# Patient Record
Sex: MALE | Race: BLACK OR AFRICAN AMERICAN | Employment: FULL TIME | URBAN - METROPOLITAN AREA
[De-identification: names, ages, dates, MRNs, and addresses within clinical notes are randomized per-mention and may not be internally consistent; named-entity substitution may affect disease eponyms.]

---

## 2017-01-04 PROBLEM — R00.2 PALPITATIONS: Status: ACTIVE | Noted: 2017-01-04

## 2017-01-04 PROBLEM — R94.31 ABNORMAL EKG: Status: ACTIVE | Noted: 2017-01-04

## 2017-01-04 PROBLEM — Z82.49 FAMILY HISTORY OF ISCHEMIC HEART DISEASE (IHD): Status: ACTIVE | Noted: 2017-01-04

## 2021-12-12 ENCOUNTER — HOSPITAL ENCOUNTER (EMERGENCY)
Age: 36
Discharge: HOME | End: 2021-12-12
Attending: EMERGENCY MEDICINE

## 2021-12-12 VITALS
HEART RATE: 93 BPM | WEIGHT: 180 LBS | DIASTOLIC BLOOD PRESSURE: 82 MMHG | TEMPERATURE: 98.3 F | RESPIRATION RATE: 18 BRPM | HEIGHT: 72 IN | BODY MASS INDEX: 24.38 KG/M2 | OXYGEN SATURATION: 98 % | SYSTOLIC BLOOD PRESSURE: 119 MMHG

## 2021-12-12 DIAGNOSIS — L30.9 DERMATITIS: ICD-10-CM

## 2021-12-12 DIAGNOSIS — L03.115 CELLULITIS OF RIGHT LOWER EXTREMITY: Primary | ICD-10-CM

## 2021-12-12 PROCEDURE — 99282 EMERGENCY DEPT VISIT SF MDM: CPT

## 2021-12-12 RX ORDER — CLINDAMYCIN HYDROCHLORIDE 150 MG/1
450 CAPSULE ORAL EVERY 6 HOURS
Qty: 84 CAPSULE | Refills: 0 | Status: SHIPPED | OUTPATIENT
Start: 2021-12-12 | End: 2021-12-19

## 2021-12-12 NOTE — ED NOTES
I have reviewed discharge instructions with the patient. The patient verbalized understanding. Patient left ED via Discharge Method: ambulatory to Home with self    Opportunity for questions and clarification provided. Patient given 1 scripts. To continue your aftercare when you leave the hospital, you may receive an automated call from our care team to check in on how you are doing. This is a free service and part of our promise to provide the best care and service to meet your aftercare needs.  If you have questions, or wish to unsubscribe from this service please call [phone]. Thank you for Choosing our [facility] Department.

## 2021-12-12 NOTE — ED TRIAGE NOTES
Pt has hx of eczema. Pt states that he thinks that it is infected this time. Pt with rash to right ankle, left forearm, and bilateral legs.

## 2021-12-12 NOTE — ED PROVIDER NOTES
HPI   30-year-old male presents to the emergency department with complaints of worsening skin infection at his right lower leg. States he has a history significant for eczema, states that each winter this flares up. Has been managing this with Aquaphor, but states that one patch at the medial right lower leg seems got infected approximately 1 week ago. States that all sites are quite itchy, and he has tried not to scratch, but fears that scratching is contributed to developing cellulitis. Recent antibiotics, no other medical problems known, no known allergies to medications, no known immune suppression. No similar symptoms in household members. Denies fevers and chills, pain, pain with range of motion or ambulation, recent weight loss or night sweats and all other complaint. Patient is pleasant very well-appearing, person in no acute distress and is nontoxic-appearing. Afebrile and hemodynamic stable    No past medical history on file. No past surgical history on file. Family History:   Problem Relation Age of Onset    No Known Problems Mother     Coronary Art Dis Father        Social History     Socioeconomic History    Marital status:      Spouse name: Not on file    Number of children: Not on file    Years of education: Not on file    Highest education level: Not on file   Occupational History    Not on file   Tobacco Use    Smoking status: Former Smoker     Quit date: 10/10/2016     Years since quittin.1    Smokeless tobacco: Not on file   Substance and Sexual Activity    Alcohol use:  Yes    Drug use: Not on file    Sexual activity: Not on file   Other Topics Concern    Not on file   Social History Narrative    Not on file     Social Determinants of Health     Financial Resource Strain:     Difficulty of Paying Living Expenses: Not on file   Food Insecurity:     Worried About Running Out of Food in the Last Year: Not on file    Ran Out of Food in the Last Year: Not on file   Transportation Needs:     Lack of Transportation (Medical): Not on file    Lack of Transportation (Non-Medical): Not on file   Physical Activity:     Days of Exercise per Week: Not on file    Minutes of Exercise per Session: Not on file   Stress:     Feeling of Stress : Not on file   Social Connections:     Frequency of Communication with Friends and Family: Not on file    Frequency of Social Gatherings with Friends and Family: Not on file    Attends Jew Services: Not on file    Active Member of [address] [place] [address] [employer] or Organizations: Not on file    Attends Club or Organization Meetings: Not on file    Marital Status: Not on file   Intimate Partner Violence:     Fear of Current or Ex-Partner: Not on file    Emotionally Abused: Not on file    Physically Abused: Not on file    Sexually Abused: Not on file   Housing Stability:     Unable to Pay for Housing in the Last Year: Not on file    Number of [place] in the Last Year: Not on file    Unstable Housing in the Last Year: Not on file         ALLERGIES: Patient has no known allergies. Review of Systems  Constitutional: Negative for fever. Negative for appetite change, chills, diaphoresis and unexpected weight change. HENT: Negative     Eyes: Negative   Respiratory: Negative  Cardiovascular: Negative  Musculoskeletal: Negative   Skin: Negative     Allergic/Immunologic: Negative  Neurological: Negative           Vitals:    12/12/21 1002   BP: 119/82   Pulse: 93   Resp: 18   Temp: 98.3 °F (36.8 °C)   SpO2: 98%   Weight: 81.6 kg (180 lb)   Height: 6' (1.829 m)            Physical Exam   Constitutional: Oriented to person, place, and time. Appears well-developed and well-nourished. No distress. HENT:    Head: Normocephalic and atraumatic   Right Ear: External ear normal.    Left Ear: External ear normal.     Nose: Nose normal.   Mouth/Throat: Mouth normal.    Eyes: Conjunctivae are normal. Pupils are equal, round, and reactive to light.    Neck: Supple. No tracheal deviation. Cardiovascular: Normal rate, intact distal pulses. Brisk capillary refill intact, less than 2 seconds. Regular rhythm present. No pitting edema. Pulmonary/Chest: Lungs are clear & equal bilaterally. No adventitious sounds. No respiratory distress. Abdominal: Soft. There is no tenderness. Musculoskeletal: No obvious deformity, erythema, edema. No Swelling, no pitting edema, no tenderness, no crepitus or instability. To bear weight and ambulate without pain or difficulty. Neurological: Alert and oriented to person, place, and time. No numbness/tingling. No loss of sensation. Positive PMS ×4. GCS= 15. Skin: 4 cm x 5 cm area of excoriation, erythema and scaling at the medial aspect of the right lower leg. There is no purulence or drainage, no active bleeding. Findings consistent with cellulitis. There are multiple small patches of the bilateral extremities that are raised, reddened and consistent with atopic dermatitis. Skin is warm and dry. Capillary refill takes less than 2 seconds. No other abrasion, no lesion, no petechiae and no rash noted. Not diaphoretic. No cyanosis, erythema, or pallor. Psychiatric: Normal mood and affect. Behavior is normal.    Nursing note and vitals reviewed. MDM:  82-year-old male in the ED with concern for skin infection, complaint of upper recent eczema flare. As in HPI. Is pleasant very well-appearing, appears no acute distress, weightbearing amatory difficulty. Denies injury, activity change, fevers or chills. Denies chest pain tightness, difficulty breathing or hemoptysis, no cough, no recent URI. No recent weight loss or night sweats. Reports a history of eczema denies other medical problems. No immune suppression, denies injection drug use.   States that he has been managing it is typically experienced all/winter eczema flare with emollient, small area at the lower leg appears of gotten infected secondary to scratching. He has multiple small lesions at the bilateral upper lower extremities are consistent with atopic dermatitis. Areas at the lower extremities are excoriated, small amount of bleeding from some of the sites. Area of patient's concern does have some erythema and I suspect secondary skin infection. Low clinical suspicion of bony pathology, necrotizing process, abscess, gout flare, septic joint or other emergent process. Low suspicion of foreign body. Will cover with oral antibiotics. Discussed on other therapeutic measures/management. Made aware of danger signs to be watchful of and advised to return to the ED immediately for new, worsening, concerning symptoms, danger signs we have discussed, spread of area of redness, development of fevers or chills or other concerns. The follow with [facility] or other PCP in 2-3 days for reevaluation. Patient declines offer of any further work-up or management in the ED and request discharge home, which is reasonable. Patient is well-hydrated appearing, no distress. Nontoxic-appearing, tolerating oral intake, hemodynamically stable. All findings and plan were discussed with the patient. All questions answered. Discussed with the patient that an unremarkable evaluation in the ED does not preclude the development or presence of a serious or life threatening condition. Patient was instructed to return immediately for any worsening or change in current symptoms, or if symptoms do not continue to improve. I instructed them to follow up with their primary care provider, own specialist, or medical provider that I am recommending for him within the next 2-3 days  The patient acknowledged understanding plan of care and affirmed approval.     Signed by: JACKELINE Morelos     This note created using Dragon voice recognition software.   Please excuse any accidental errors associated with its use, as note has not been fully proofread and edited.         Procedures

## 2021-12-12 NOTE — DISCHARGE INSTRUCTIONS
Return to the ED immediately for any new, worsening, or concerning symptoms, or danger signs as we discussed. Otherwise, follow up with your PCP in 1-2 days for reevaluation.